# Patient Record
Sex: MALE | Race: BLACK OR AFRICAN AMERICAN | Employment: FULL TIME | URBAN - METROPOLITAN AREA
[De-identification: names, ages, dates, MRNs, and addresses within clinical notes are randomized per-mention and may not be internally consistent; named-entity substitution may affect disease eponyms.]

---

## 2021-01-20 ENCOUNTER — OFFICE VISIT (OUTPATIENT)
Dept: OCCUPATIONAL MEDICINE | Age: 31
End: 2021-01-20
Attending: FAMILY MEDICINE

## 2021-04-16 ENCOUNTER — HOSPITAL ENCOUNTER (EMERGENCY)
Facility: HOSPITAL | Age: 31
Discharge: HOME | End: 2021-04-16
Attending: EMERGENCY MEDICINE
Payer: MEDICAID

## 2021-04-16 VITALS
HEIGHT: 75 IN | DIASTOLIC BLOOD PRESSURE: 88 MMHG | RESPIRATION RATE: 16 BRPM | TEMPERATURE: 98 F | HEART RATE: 69 BPM | OXYGEN SATURATION: 100 % | SYSTOLIC BLOOD PRESSURE: 138 MMHG | BODY MASS INDEX: 26.11 KG/M2 | WEIGHT: 210 LBS

## 2021-04-16 DIAGNOSIS — N34.2 URETHRITIS: Primary | ICD-10-CM

## 2021-04-16 PROCEDURE — 96374 THER/PROPH/DIAG INJ IV PUSH: CPT

## 2021-04-16 PROCEDURE — 87086 URINE CULTURE/COLONY COUNT: CPT | Performed by: EMERGENCY MEDICINE

## 2021-04-16 PROCEDURE — 87591 N.GONORRHOEAE DNA AMP PROB: CPT | Performed by: EMERGENCY MEDICINE

## 2021-04-16 PROCEDURE — 87491 CHLMYD TRACH DNA AMP PROBE: CPT | Performed by: EMERGENCY MEDICINE

## 2021-04-16 PROCEDURE — 99283 EMERGENCY DEPT VISIT LOW MDM: CPT

## 2021-04-16 PROCEDURE — 81001 URINALYSIS AUTO W/SCOPE: CPT | Performed by: EMERGENCY MEDICINE

## 2021-04-16 RX ORDER — AZITHROMYCIN 250 MG/1
1000 TABLET, FILM COATED ORAL ONCE
Status: COMPLETED | OUTPATIENT
Start: 2021-04-16 | End: 2021-04-16

## 2021-04-16 NOTE — ED PROVIDER NOTES
Patient Seen in: [facility] Emergency Department      History   Patient presents with:  Eval-G  Testing    Stated Complaint: Requesting COVID screening for headache last night, and also STD screening for *    HPI/Subjective:   HPI    Patient present Temp 97.9 °F (36.6 °C) (Temporal)   Resp 16   Ht 190.5 cm (6' 3\")   Wt 95.3 kg   SpO2 100%   BMI 26.25 kg/m²         Physical Exam    General: Alert and oriented x3 in no acute distress.   HEENT: Normocephalic, atraumatic, pupils equal round and reactive t for this patient.

## 2021-04-16 NOTE — ED INITIAL ASSESSMENT (HPI)
Pt is ambulatory to room, reports headache chills and body aches. Pt states he was around people and believes that he may have covid. Pt is also looking to get tested for STD.  States that he is in a new relationship and wants to make sure he does not have

## 2021-04-19 NOTE — ED NOTES
Received call from infection control that patient was treated with 250mg IM Ceftriaxone vs 500mg IM Ceftriaxone for positive gonorrhea. Discussed with Dr. Charlette Guillen at this time.  Will call in rx for 400mg cefixime PO and she would also like to treat prophyla